# Patient Record
Sex: FEMALE | NOT HISPANIC OR LATINO | URBAN - METROPOLITAN AREA
[De-identification: names, ages, dates, MRNs, and addresses within clinical notes are randomized per-mention and may not be internally consistent; named-entity substitution may affect disease eponyms.]

---

## 2018-01-04 ENCOUNTER — IMPORTED ENCOUNTER (OUTPATIENT)
Dept: URBAN - METROPOLITAN AREA CLINIC 31 | Facility: CLINIC | Age: 23
End: 2018-01-04

## 2018-01-04 PROBLEM — R51: Noted: 2018-01-04

## 2018-01-04 PROCEDURE — 92004 COMPRE OPH EXAM NEW PT 1/>: CPT

## 2018-01-04 NOTE — PATIENT DISCUSSION
1.  Headache of non-ocular origin - Patient has headache that cannot be explained by ocular exam and testing. Continue to follow with managing medical practitioner. 2. Return for an appointment in 2 years for comprehensive exam. with Dr. Miguel Molina. 3.   No glasses necessary.

## 2022-04-02 ASSESSMENT — VISUAL ACUITY
OD_CC: 20/20
OS_CC: 20/20

## 2023-06-15 ENCOUNTER — PREPPED CHART (OUTPATIENT)
Dept: URBAN - METROPOLITAN AREA CLINIC 29 | Facility: CLINIC | Age: 28
End: 2023-06-15